# Patient Record
Sex: MALE | Race: WHITE | NOT HISPANIC OR LATINO | URBAN - METROPOLITAN AREA
[De-identification: names, ages, dates, MRNs, and addresses within clinical notes are randomized per-mention and may not be internally consistent; named-entity substitution may affect disease eponyms.]

---

## 2020-09-29 ENCOUNTER — CONVERSION ENCOUNTER (OUTPATIENT)
Dept: FAMILY MEDICINE CLINIC | Facility: CLINIC | Age: 20
End: 2020-09-29

## 2020-09-29 ENCOUNTER — OFFICE VISIT CONVERTED (OUTPATIENT)
Dept: FAMILY MEDICINE CLINIC | Facility: CLINIC | Age: 20
End: 2020-09-29
Attending: INTERNAL MEDICINE

## 2021-04-28 ENCOUNTER — OFFICE VISIT CONVERTED (OUTPATIENT)
Dept: ORTHOPEDIC SURGERY | Facility: CLINIC | Age: 21
End: 2021-04-28
Attending: ORTHOPAEDIC SURGERY

## 2021-05-10 NOTE — H&P
History and Physical      Patient Name: Paul Henderson   Patient ID: [id number]   Sex: Male   YOB: 2000    Primary Care Provider: Javy Olson DO   Referring Provider: Javy Olson DO    Visit Date: September 29, 2020    Provider: Javy Olson DO   Location: [facility]   Location Address: [address] [place] [address], [address]  [place], [place]  [id number]   Location Phone: [phone]          Chief Complaint  · New patient to establish care       History Of Present Illness  Paul Henderson is a 19 year old /White male who presents for evaluation and treatment of:      Patient is here to establish new care. He was previously seen by Dr. Dow.    Patient has no previous health history, no significant family history.  Patient takes no daily medications.     Presents to office with no complaints.       Past Medical History  Disease Name Date Onset Notes   ***No Significant Medical History --  --    Fx-Distal Radius 07/01/2015 --          Past Surgical History  Procedure Name Date Notes   *No Past Surgical History --  --    I have had no surgeries --  --          Allergy List  Allergen Name Date Reaction Notes   NO KNOWN DRUG ALLERGIES --  --  --          Family Medical History  Disease Name Relative/Age Notes   *No Known Family History  --          Social History  Finding Status Start/Stop Quantity Notes   Alcohol Never --/-- --  --    Alcohol Use Never --/-- --  does not drink   lives with parents --  --/-- --  --    Recreational Drug Use Never --/-- --  no   Single. --  --/-- --  --    Student. --  --/-- --  --    Tobacco Never --/-- --  never smoker         Review of Systems  · Constitutional  o Denies  o : fatigue, night sweats  · Eyes  o Denies  o : double vision, blurred vision  · HENT  o Denies  o : vertigo, recent head injury  · Cardiovascular  o Denies  o : chest pain, irregular heart beats  · Respiratory  o Denies  o : shortness of breath, productive  cough  · Gastrointestinal  o Denies  o : nausea, vomiting  · Genitourinary  o Denies  o : dysuria, urinary retention  · Integument  o Denies  o : hair growth change, new skin lesions  · Neurologic  o Denies  o : altered mental status, seizures  · Musculoskeletal  o Denies  o : joint swelling, limitation of motion  · Endocrine  o Denies  o : cold intolerance, heat intolerance  · Psychiatric  o Denies  o : anxiety, depression  · Heme-Lymph  o Denies  o : petechiae, lymph node enlargement or tenderness  · Allergic-Immunologic  o Denies  o : frequent illnesses      Vitals  Date Time BP Position Site L\R Cuff Size HR RR TEMP (F) WT  HT  BMI kg/m2 BSA m2 O2 Sat HC       09/29/2020 11:07 /68 Sitting    72 - R  98 185lbs 4oz 6'   25.12 2.07 98 %          Physical Examination  · Constitutional  o Appearance  o : alert, oriented, in no acute distress, well developed, well-nourished  · Eyes  o Vision  o : Conjuntivae: Normal, Sclerae white, Pupils: PERRL, Cornea: Clear, no lesions bilateral  · Ears, Nose, Mouth and Throat  o Ears  o : Ext. Ears: Normal shape, Non tender, EACs: Normal , Tragus intact bilaterally, Hearing: intact to conversational voice bilaterally  o Nose  o : No nasal discharge, Mucosa: normal, Septum: midline, Sinuses: Nontender  o Throat  o : Oropharynx: no inflmation or lesions, no purulence or drainage  · Neck  o Inspection/Palpation  o : Supple, no masses or tenderness, no deformities, Trachea: Midline, ROM: with in normal limits, no neck stiffness, no lymphadenopathy  o Thyroid  o : no thyromegaly, no palpabale masses  · Respiratory  o Auscultation of Lungs  o : normal breath sounds throughout, no wheeze, rhonchi, or crackles  · Cardiovascular  o Heart  o : Regular rate and rhythm, Normal S1,S2 , No cardiac murmers, No S3 or S4 gallop or rubs  · Gastrointestinal  o Abdominal Examination  o : abdomen soft, nontender, non distended, no rigidity, gaurding, rebound tenderness, no ventral hernias  present  o Liver and spleen  o : no hepatomegaly present, liver nontender to palpation, spleen not palpable  · Musculoskeletal  o General  o :   § General Musculoskeletal  § : No joint swelling or deformity., Muscle tone, strength, and development grossly normal.  · Skin and Subcutaneous Tissue  o General Inspection  o : no rashes on visible skin, normal skin color, warm and dry  o Digits and Nails  o : no clubbing, cyanosis, deformities or edema present, normal appearing nails  · Neurologic  o Mental Status Examination  o : alert and oriented to time, place, and person. Gait and Station: normal gait, able to stand without difficulty. CN 2-12 grossly intact   · Psychiatric  o Judgement and Insight  o : judgment and insight intact  o Mood and Affect  o : normal mood and affect          Assessment  · Establishing care with new doctor, encounter for     V65./Z76.89    Problems Reconciled  Plan  · Orders  o CBC with Auto Diff [facility] ([id number]) - V65.8/Z76.89 - 09/29/2020  o CMP [facility] ([id number]) - 5.8/Z.89 - 09/29/2020  o Lipid Panel [facility] ([id number]) - 5.8/Z76.89 - 09/29/2020  o ACO-39: Current medications updated and reviewed (1159F, ) - - 09/29/2020  o ACO-14: Influenza immunization was not administered for reasons documented () - - 09/29/2020   Patient refused  · Medications  o Medications have been Reconciled  o Transition of Care or Provider Policy  · Instructions  o Patient was educated/instructed on their diagnosis, treatment and medications prior to discharge from the clinic today.  o Patient instructed to seek medical attention urgently for new or worsening symptoms.  o Call the office with any concerns or questions.  o Minutes spent with patient including greater than 50% in Education/Counseling/Care Coordination.  o Time spent with the patient was minutes, more than 50% face to face.  o Chronic conditions reviewed and taken into consideration for today's treatment plan.  o Discussed Covid-19 precautions  including, but not limited to, social distancing, avoid touching your face, and hand washing.   · Disposition  o Call or Return if symptoms worsen or persist.            Electronically Signed by: Javy Olson DO -Author on September 29, 2020 11:30:19 AM

## 2021-05-11 NOTE — H&P
History and Physical      Patient Name: Paul Henderson   Patient ID: [id number]   Sex: Male   YOB: 2000    Primary Care Provider: Javy Olson DO   Referring Provider: Javy Olson DO    Visit Date: April 28, 2021    Provider: Kevin Heller MD   Location: [facility]   Location Address: [address] [place] [address]  [id number]   Location Phone: [phone]          Chief Complaint  · Right Wrist      History Of Present Illness  Paul Henderson is a 20 year old /White male who presents today to [facility].      Patient presents today for an evaluation of right wrist. He states he has no pain in his right wrist. He had a previous right wrist styloid fracture that has healed since 2016. He states he needs clearance for his wrist because he is joining the .       Past Medical History  ***No Significant Medical History; Fx-Distal Radius         Past Surgical History  *No Past Surgical History; I have had no surgeries         Allergy List  NO KNOWN DRUG ALLERGIES       Allergies Reconciled  Family Medical History  *No Known Family History         Social History  Alcohol (Never); Alcohol Use (Never); lives with parents; Recreational Drug Use (Never); Single.; Student.; Tobacco (Never)         Review of Systems  · Constitutional  o Denies  o : fever, chills, weight loss  · Cardiovascular  o Denies  o : chest pain, shortness of breath  · Gastrointestinal  o Denies  o : liver disease, heartburn, nausea, blood in stools  · Genitourinary  o Denies  o : painful urination, blood in urine  · Integument  o Denies  o : rash, itching  · Neurologic  o Denies  o : headache, weakness, loss of consciousness  · Musculoskeletal  o Denies  o : painful, swollen joints  · Psychiatric  o Denies  o : drug/alcohol addiction, anxiety, depression      Vitals  Date Time BP Position Site L\R Cuff Size HR RR TEMP (F) WT  HT  BMI kg/m2 BSA m2 O2 Sat FR L/min FiO2 HC        04/28/2021 02:27 PM      76 - R   197lbs 0oz 6'   26.72 2.13 97 %            Physical Examination  · Constitutional  o Appearance  o : well developed, well-nourished, no obvious deformities present  · Head and Face  o Head  o :   § Inspection  § : normocephalic  o Face  o :   § Inspection  § : no facial lesions  · Eyes  o Conjunctivae  o : conjunctivae normal  o Sclerae  o : sclerae white  · Ears, Nose, Mouth and Throat  o Ears  o :   § External Ears  § : appearance within normal limits  § Hearing  § : intact  o Nose  o :   § External Nose  § : appearance normal  · Neck  o Inspection/Palpation  o : normal appearance  o Range of Motion  o : full range of motion  · Respiratory  o Respiratory Effort  o : breathing unlabored  o Inspection of Chest  o : normal appearance  o Auscultation of Lungs  o : no audible wheezing or rales  · Cardiovascular  o Heart  o : regular rate  · Gastrointestinal  o Abdominal Examination  o : soft and non-tender  · Skin and Subcutaneous Tissue  o General Inspection  o : intact, no rashes  · Psychiatric  o General  o : Alert and oriented x3  o Judgement and Insight  o : judgment and insight intact  o Mood and Affect  o : mood normal, affect appropriate  · Right Wrist  o Inspection  o : Neurovascular intact. Sensation grossly intact. No swelling, atrophy, and skin discoloration. Skin intact. Full ROM. Patient able to wiggle fingers and make a fist. Full wrist extension, full wrist flexion, full , full thumb opposition, full PIP flexors, full DIP flexors, full PIP extensors, full finger adduction, full finger abduction. Radial pulse 2+, ulnar pulse 2+.  · In Office Procedures  o View  o : AP/LATERAL  o Site  o : right, wrist   o Indication  o : Right wrist  o Study  o : X-rays ordered, taken in the office, and reviewed today.  o Xray  o : No acute osseous abnormality.          Assessment  · Right wrist pain     719.43/M25.531      Plan  · Orders  o Wrist (Right) 2 views X-Ray [facility]  ([id number]) - 719.43/M25.531 - 04/28/2021  · Medications  o Medications have been Reconciled  o Transition of Care or Provider Policy  · Instructions  o Dr. Heller saw and examined the patient and agrees with plan.   o X-rays reviewed by Dr. Heller.  o Reviewed the patient's Past Medical, Social, and Family history as well as the ROS at today's visit, no changes.  o Call or return if worsening symptoms.  o Follow Up PRN.  o Discussed treatment plans and diagnosis with the patient. Patient will progress back into activities of daily living as tolerated. He has no pain and no radiographically abnormalities of his right wrist. His previous fracture is fully healed. He has full function of his right wrist.   o The above service was scribed by Brenna Duffy on my behalf and I attest to the accuracy of the note. [email or web address]            Electronically Signed by: Brenna Duffy-, Other -Author on April 29, 2021 09:59:43 AM  Electronically Co-signed by: Kevin Heller MD -Reviewer on April 29, 2021 05:10:00 PM

## 2021-05-14 VITALS
TEMPERATURE: 98 F | SYSTOLIC BLOOD PRESSURE: 135 MMHG | DIASTOLIC BLOOD PRESSURE: 68 MMHG | HEIGHT: 72 IN | WEIGHT: 185.25 LBS | BODY MASS INDEX: 25.09 KG/M2 | HEART RATE: 72 BPM | OXYGEN SATURATION: 98 %

## 2021-05-14 VITALS — HEIGHT: 72 IN | WEIGHT: 197 LBS | HEART RATE: 76 BPM | OXYGEN SATURATION: 97 % | BODY MASS INDEX: 26.68 KG/M2
